# Patient Record
Sex: FEMALE | Race: WHITE
[De-identification: names, ages, dates, MRNs, and addresses within clinical notes are randomized per-mention and may not be internally consistent; named-entity substitution may affect disease eponyms.]

---

## 2017-03-21 ENCOUNTER — HOSPITAL ENCOUNTER (OUTPATIENT)
Dept: HOSPITAL 17 - PHSDC | Age: 52
Discharge: HOME | End: 2017-03-21
Attending: PAIN MEDICINE
Payer: MEDICARE

## 2017-03-21 DIAGNOSIS — M79.604: ICD-10-CM

## 2017-03-21 DIAGNOSIS — M79.605: ICD-10-CM

## 2017-03-21 DIAGNOSIS — M54.5: Primary | ICD-10-CM

## 2017-03-21 PROCEDURE — 62323 NJX INTERLAMINAR LMBR/SAC: CPT

## 2017-03-21 PROCEDURE — 99152 MOD SED SAME PHYS/QHP 5/>YRS: CPT

## 2017-03-25 NOTE — M6
cc:

JULIANNE CACERES M.D.

****

 

 

DATE

2017

 

 1965

 

PROCEDURE

Caudal epidural steroid injection.

 

PROCEDURE NOTE:  An IV was started, blood pressure cuff, pulse oximeter and EKG

were applied.  The patient was placed in the prone position on a Chemo table,

sedated with small amounts of propofol titrated to effect.  Vital signs were

monitored and remained stable throughout the procedure.  The sacral and

coccygeal area were scrubbed with antimicrobial solution, prepped with 10%

Betadine solution, draped with sterile drapes.  Fluoroscopy was used in the

lateral projection to clearly visualize the sacral hiatus.  A 2-inch 22 gauge

spinal needle was advanced under fluoroscopic guidance until it was positioned

between the anterior and posterior lamina of the sacrum.  There was negative

aspiration for blood or CSF.  No crepitus to injection of 3 mL of air.  The

patient was slowly given 20 mL of 0.5% Xylocaine, 80 mg of Depo-Medrol.

Following this the patient was taken to the recovery room with stable vital

signs, neurologically intact.

 

 

 

                              _________________________________

                              W. MD JEANETH Ray/

D:  3/21/2017/8:50 AM

T:  3/25/2017/10:11 PM

Visit #:  [id number]

Job #:  [id number]

## 2017-06-06 ENCOUNTER — HOSPITAL ENCOUNTER (OUTPATIENT)
Dept: HOSPITAL 17 - PHSDC | Age: 52
Discharge: HOME | End: 2017-06-06
Attending: PAIN MEDICINE
Payer: MEDICARE

## 2017-06-06 DIAGNOSIS — Z91.040: ICD-10-CM

## 2017-06-06 DIAGNOSIS — M79.662: ICD-10-CM

## 2017-06-06 DIAGNOSIS — M54.9: Primary | ICD-10-CM

## 2017-06-06 DIAGNOSIS — M79.661: ICD-10-CM

## 2017-06-06 DIAGNOSIS — Z88.5: ICD-10-CM

## 2017-06-06 PROCEDURE — 99152 MOD SED SAME PHYS/QHP 5/>YRS: CPT

## 2017-06-06 PROCEDURE — 62323 NJX INTERLAMINAR LMBR/SAC: CPT

## 2017-06-07 NOTE — M6
cc:

JULIANNE LOVE M.D.

****

 

 

DATE:  6/6/2017

 

YOB: 1965

 

PROCEDURE

Caudal epidural steroid injection.

 

PROCEDURE NOTE

History and physical was completed and signed.  Consent was signed.  Procedure

site was marked.  Medications were listed and reconciled.  Pain score was

recorded.  Allergies were noted.  Timeout was taken.  Fluoroscopy time was

recorded where applicable.

 

Sedation was administered or directed by Dr. Love.  The patient was given

oxygen.  The patient was monitored by a registered nurse.  Total procedure time

was greater than 15 minutes.

 

An IV was started, blood pressure cuff, pulse oximeter and EKG were applied.

The patient was placed in the prone position, sedated with small amounts of

Versed and propofol titrated to effect.  Vital signs were monitored and

remained stable throughout the procedure.  The sacral and coccygeal area were

scrubbed with antimicrobial solution and prepped with 10% Betadine solution.

The sacral hiatus was palpated.  A 2-1/2 inch, 20-gauge spinal needle was

inserted easily on the first attempt.  There was negative aspiration for blood

or CSF.  There was no apparent paresthesia.  The patient was slowly given 20 mL

of 0.5% Xylocaine, 80 mg of Depo-Medrol.  The patient was taken to the recovery

area with stable vital signs, neurologically intact.

 

 

 

                              _________________________________

                              W. MD JEANETH Ray/CAMILLE

D:  6/6/2017/9:19 AM

T:  6/7/2017/8:55 AM

Visit #:  [id number]

Job #:  [id number]

## 2017-09-18 ENCOUNTER — HOSPITAL ENCOUNTER (EMERGENCY)
Dept: HOSPITAL 17 - NEPD | Age: 52
Discharge: HOME | End: 2017-09-18
Payer: MEDICARE

## 2017-09-18 VITALS — BODY MASS INDEX: 23.88 KG/M2 | WEIGHT: 143.3 LBS | HEIGHT: 65 IN

## 2017-09-18 VITALS — HEART RATE: 86 BPM | DIASTOLIC BLOOD PRESSURE: 60 MMHG | SYSTOLIC BLOOD PRESSURE: 136 MMHG

## 2017-09-18 VITALS
TEMPERATURE: 97.6 F | HEART RATE: 77 BPM | SYSTOLIC BLOOD PRESSURE: 194 MMHG | OXYGEN SATURATION: 99 % | RESPIRATION RATE: 24 BRPM | DIASTOLIC BLOOD PRESSURE: 124 MMHG

## 2017-09-18 VITALS — DIASTOLIC BLOOD PRESSURE: 60 MMHG | SYSTOLIC BLOOD PRESSURE: 136 MMHG
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
## 2017-09-18 DIAGNOSIS — S46.911A: Primary | ICD-10-CM

## 2017-09-18 PROCEDURE — 73030 X-RAY EXAM OF SHOULDER: CPT

## 2017-09-18 PROCEDURE — 99284 EMERGENCY DEPT VISIT MOD MDM: CPT

## 2017-09-18 PROCEDURE — 96372 THER/PROPH/DIAG INJ SC/IM: CPT

## 2017-09-18 NOTE — RADRPT
EXAM DATE/TIME:  09/18/2017 10:51 

 

[facility] COMPARISON:     

No previous studies available for comparison.

 

                     

INDICATIONS :     

Right shoulder pain after falling off the roof last night.

                     

 

MEDICAL HISTORY :     

Hypertension.       Smoker.   

 

SURGICAL HISTORY :     

None.   

 

ENCOUNTER:     

Initial                                        

 

ACUITY:     

2 days      

 

PAIN SCORE:     

10/10

 

LOCATION:     

Right  shoulder joint.

 

FINDINGS:     

Degenerative changes anatomic alignment without fracture.  Previous cervical spine fusion.  Lung apex
 is clear.

 

CONCLUSION:     Negative for fracture, degenerative changes.  

 

 

 Jere Cota MD FACR on September 18, 2017 at 11:00           

Board Certified Radiologist.

 This report was verified electronically.

## 2017-09-18 NOTE — PD
HPI


Chief Complaint:  Injury


Time Seen by Provider:  10:16


Travel History


International Travel<30 days:  No


Contact w/Intl Traveler<30days:  No


Traveled to known affect area:  No





History of Present Illness


HPI


51-year-old female came to the emergency room with right shoulder pain.  

Patient says that yesterday she was up on her roof cleaning when she fell.  As 

was a fall from about 10-12 feet height.  She landed on her right shoulder she 

says.  It has been hurting a lot.  Patient says she was unable to sleep at 

night because of the pain.  Vital signs are stable.  Patient is on significant 

amount of pain medication on a daily basis at home.  She was hypertensive but 

rest of the vital signs were stable.





[facility]


Past Medical History


*** Narrative Medical


List of her past medical, surgical, social and family history as reviewed from 

the nursing note.


Hypertension:  Yes (related to pain)


Musculoskeletal:  Yes (chronic neck and back pain from hx of fx)


Tetanus Vaccination:  < 5 Years


Influenza Vaccination:  No


Pregnant?:  Not Pregnant





Past Surgical History


Hysterectomy:  Yes


Other Surgery:  Yes (neck and cervical spine)





Social History


Alcohol Use:  Yes (occasionally)


Tobacco Use:  Yes (1/2 pack a day)


Substance Use:  Yes (medical marijuana)





Allergies-Medications


(Allergen,Severity, Reaction):  


Coded Allergies:  


     morphine (Unverified  Allergy, Mild, Rash, 8/15/17)


     latex (Unverified  Allergy, Unknown, 8/15/17)


Comments


List of her allergies reviewed from the nursing note.


Reported Meds & Prescriptions





Reported Meds & Active Scripts


Active


Reported


Methadone (Methadone HCl) 10 Mg Tab 10 Mg PO TID


Vitamin C ER (Ascorbic Acid) 500 Mg Donna 500 Mg PO DAILY


Milk Thistle 140 Mg Cap 1 Cap PO DAILY


Oxycodone ER (Oxycodone HCl) 20 Mg Tab 20 Mg PO Q8HR


Restasis Opth 0.05% (Cyclosporine Opth 0.05%) 0.05% Emul 1 Drop EACH EYE BID


Vitamin D-1000 Maximum St (Cholecalciferol) 1,000 Unit Tab 1,000 Units PO DAILY


Flexeril (Cyclobenzaprine HCl) 10 Mg Tab 10 Mg PO BID


Estradiol 1 Mg Tab 1 Mg PO DAILY


Dilaudid (Hydromorphone HCl) 4 Mg Tab 4 Mg PO TID PRN


Lisinopril 20 Mg Tab 20 Mg PO BID


Lyrica (Pregabalin) 75 Mg Cap 75 Mg PO TID


Omeprazole 20 Mg Cap 1 Cap PO DAILY


Vitamin B-Complex (B-Complex Vitamins) 1 Tab 1 Tab PO DAILY





Narrative Medication


List of her home medications reviewed from the nursing note.





Review of Systems


Except as stated in HPI:  all other systems reviewed are Neg





Physical Exam


Narrative


GENERAL: Awake, alert, significant distress


SKIN: Focused skin assessment warm/dry.


HEAD: Atraumatic. Normocephalic. 


EYES: Pupils equal and round. No scleral icterus. No injection or drainage. 


ENT: No nasal bleeding or discharge.  Mucous membranes pink and moist.


NECK: Trachea midline. No JVD. 


CARDIOVASCULAR: Regular rate and rhythm.  No murmur appreciated.


RESPIRATORY: No accessory muscle use. Clear to auscultation. Breath sounds 

equal bilaterally. 


GASTROINTESTINAL: Abdomen soft, non-tender, nondistended. Hepatic and splenic 

margins not palpable. 


MUSCULOSKELETAL: No obvious deformities. No clubbing.  No cyanosis.  No edema.  

Decreased range of motion of the right shoulder joint due to pain.  No obvious 

deformity.  Tender to even touch.


NEUROLOGICAL: Awake and alert. No obvious cranial nerve deficits.  Motor 

grossly within normal limits. Normal speech.


PSYCHIATRIC: Appropriate mood and affect; insight and judgment normal.





Data


Data


Last Documented VS





Vital Signs








  Date Time  Temp Pulse Resp B/P (MAP) Pulse Ox O2 Delivery O2 Flow Rate FiO2


 


9/18/17 12:17  86  136/60 (85) 97   


 


9/18/17 09:32 97.6  24   Room Air  








Orders





 Orders


Hydromorphone Pf Inj (Dilaudid Pf Inj) (9/18/17 10:30)


Shoulder, Complete (>2vws) (9/18/17 )


Sling Cradle Arm (9/18/17 )


Sling Cradle Arm (9/18/17 )








MDM


Medical Decision Making


Medical Screen Exam Complete:  Yes


Emergency Medical Condition:  Yes


Medical Record Reviewed:  Yes


Differential Diagnosis


Shoulder dislocation, shoulder fracture


Narrative Course


11:40 AM x-ray does not show any abnormality.  I'll give her a arm sling and she

'll be discharged home.  She was given IM Dilaudid for getting the x-ray done 

given her significant discomfort.





Procedures


EKG Prior to Arrival:  No





Diagnosis





 Primary Impression:  


 Right shoulder strain


 Qualified Codes:  S46.911A - Strain of unspecified muscle, fascia and tendon 

at shoulder and upper arm level, right arm, initial encounter


Referrals:  


Primary Care Physician





***Additional Instructions:  


Please return to the ER if the condition worsen or any other new concerns.  

Continue taking your opiate medications that he already have at home for the 

pain relief.  Apply warm compresses alternating with cold compress.


***Med/Other Pt SpecificInfo:  No Change to Meds


Disposition:  01 DISCHARGE HOME


Condition:  Stable











Melba Jara MD Sep 18, 2017 10:16

## 2017-11-20 ENCOUNTER — HOSPITAL ENCOUNTER (OUTPATIENT)
Dept: HOSPITAL 17 - PHSDC | Age: 52
Discharge: HOME | End: 2017-11-20
Attending: PAIN MEDICINE
Payer: MEDICARE

## 2017-11-20 DIAGNOSIS — M79.604: ICD-10-CM

## 2017-11-20 DIAGNOSIS — M54.5: Primary | ICD-10-CM

## 2017-11-20 DIAGNOSIS — M79.605: ICD-10-CM

## 2017-11-20 PROCEDURE — 99152 MOD SED SAME PHYS/QHP 5/>YRS: CPT

## 2017-11-20 PROCEDURE — 62323 NJX INTERLAMINAR LMBR/SAC: CPT

## 2017-11-20 NOTE — M6
cc:

JULIANNE LOVE M.D.

****

 

 

DATE

11/20/2017

 

DATE OF BIRTH

1965

 

PROCEDURE

Caudal epidural steroid injection.

 

History and physical was completed and signed. Consent was signed. Procedure

site was marked. Medications were listed and reconciled. Pain score was

recorded. Allergies were noted. Time out was taken. Fluoroscopy time was

recorded where applicable.

Sedation was administered or directed by Dr. Love. The patient was given

oxygen. The patient was monitored by a registered nurse. Total procedure time

was greater than 15 minutes.

 

PROCEDURE NOTE:

An IV was started.  Blood pressure cuff, pulse oximeter and EKG were applied.

The patient was placed in the prone position, sedated with small amounts of

Versed and propofol titrated to effect.  Vital signs were monitored and

remained stable throughout the procedure.  The sacral and coccygeal area were

scrubbed with antimicrobial solution and prepped with 10% Betadine solution.

The sacral hiatus was palpated.  A 2-inch 20 gauge spinal needle was inserted

easily on the first attempt.  There was negative aspiration for blood or CSF.

There was no apparent paresthesia.  The patient was slowly given 20 mL of 0.5%

Xylocaine, 80 mg of Depo-Medrol.  The patient was taken to the recovery area

with stable vital signs, neurologically intact.

 

 

                              _________________________________

                              W. MD JEANETH Ray/MELISSA

D:  11/20/2017/10:36 AM

T:  11/20/2017/12:13 PM

Visit #:  [id number]

Job #:  [id number]

## 2018-02-09 ENCOUNTER — HOSPITAL ENCOUNTER (EMERGENCY)
Dept: HOSPITAL 17 - NEPD | Age: 53
Discharge: HOME | End: 2018-02-09
Payer: MEDICARE

## 2018-02-09 VITALS — WEIGHT: 154.32 LBS | BODY MASS INDEX: 25.71 KG/M2 | HEIGHT: 65 IN

## 2018-02-09 VITALS
OXYGEN SATURATION: 96 % | HEART RATE: 68 BPM | SYSTOLIC BLOOD PRESSURE: 184 MMHG | RESPIRATION RATE: 17 BRPM | DIASTOLIC BLOOD PRESSURE: 84 MMHG

## 2018-02-09 VITALS
TEMPERATURE: 98.3 F | SYSTOLIC BLOOD PRESSURE: 200 MMHG | RESPIRATION RATE: 22 BRPM | HEART RATE: 86 BPM | OXYGEN SATURATION: 99 % | DIASTOLIC BLOOD PRESSURE: 116 MMHG

## 2018-02-09 VITALS — DIASTOLIC BLOOD PRESSURE: 98 MMHG | SYSTOLIC BLOOD PRESSURE: 207 MMHG

## 2018-02-09 DIAGNOSIS — Z79.899: ICD-10-CM

## 2018-02-09 DIAGNOSIS — Z88.5: ICD-10-CM

## 2018-02-09 DIAGNOSIS — F17.200: ICD-10-CM

## 2018-02-09 DIAGNOSIS — I10: ICD-10-CM

## 2018-02-09 DIAGNOSIS — M25.511: Primary | ICD-10-CM

## 2018-02-09 PROCEDURE — 99283 EMERGENCY DEPT VISIT LOW MDM: CPT

## 2018-02-09 PROCEDURE — 73030 X-RAY EXAM OF SHOULDER: CPT

## 2018-02-09 NOTE — PD
HPI


Chief Complaint:  Injury


Time Seen by Provider:  14:15


Travel History


International Travel<30 days:  No


Contact w/Intl Traveler<30days:  No


Traveled to known affect area:  No





History of Present Illness


HPI


52-year-old female presents for evaluation of right arm and shoulder pain.  

Symptoms started yesterday.  She reports that yesterday she was driving, 

reached her arm behind her in order to grab her backpack when she felt sudden 

onset pain to the right shoulder and proximal arm.  The pain is an aching pain 

which is constant and worse with range of motion activities at the right 

shoulder.  She reports that she has had similar pain several times over the 

past few years and her right shoulder.  She says that she actually had an 

outpatient MRI of the right shoulder performed 2 weeks ago and she is awaiting 

follow-up in order to discuss the results.  She has no other complaints at this 

time.





[facility]


Past Medical History


Hypertension:  Yes (related to pain)


Musculoskeletal:  Yes (chronic neck and back pain from hx of fx)





Past Surgical History


Hysterectomy:  Yes


Other Surgery:  Yes (neck and cervical spine)





Social History


Alcohol Use:  Yes (occasionally)


Tobacco Use:  Yes (1/2 pack a day)


Substance Use:  Yes (medical marijuana)





Allergies-Medications


(Allergen,Severity, Reaction):  


Coded Allergies:  


     morphine (Unverified  Allergy, Mild, Rash, 2/9/18)


     latex (Unverified  Allergy, Unknown, 2/9/18)


Reported Meds & Prescriptions





Reported Meds & Active Scripts


Active


Reported


Nyquil Severe Cold/Flu Liq (Phenylephrine-Doxylamine-DM-Apap Liq) 5-6.25- 

Mg/15 Ml Liq 15 Ml PO HS


Mucinex DM Maximum Strength (Dextromethorphan-Guaifenesin ER 12 HR) 60-1,200 Mg 

Tab 1 Tab PO BID PRN


Methadone (Methadone HCl) 10 Mg Tab 10 Mg PO TID


Vitamin C ER (Ascorbic Acid) 500 Mg Donna 500 Mg PO DAILY


Milk Thistle 140 Mg Cap 1 Cap PO DAILY


Oxycodone ER (Oxycodone HCl) 20 Mg Tab 20 Mg PO Q8HR


Restasis Opth (Cyclosporine Opth) 0.05% Emul 1 Drop EACH EYE BID


Vitamin D-1000 Maximum St (Cholecalciferol) 1,000 Unit Tab 1,000 Units PO DAILY


Flexeril (Cyclobenzaprine HCl) 10 Mg Tab 10 Mg PO BID


Estradiol 1 Mg Tab 1 Mg PO DAILY


Dilaudid (Hydromorphone HCl) 4 Mg Tab 4 Mg PO TID PRN


Lisinopril 20 Mg Tab 20 Mg PO BID


Lyrica (Pregabalin) 75 Mg Cap 75 Mg PO TID


Omeprazole 20 Mg Cap 1 Cap PO DAILY


Vitamin B-Complex (B-Complex Vitamins) 1 Tab 1 Tab PO DAILY








Review of Systems


Musculoskeletal:  Positive: Limited ROM, Pain


Skin:  Positive Other (denies open wounds)


Neurologic:  No: Weakness





Physical Exam


Narrative


GENERAL: Well-developed well-nourished female in no acute distress


SKIN: Warm and dry.


HEAD: Atraumatic. Normocephalic. 


EYES: Pupils equal and round. No scleral icterus. No injection or drainage. 


ENT: No nasal bleeding or discharge.  Mucous membranes pink and moist.


NECK: Trachea midline. No JVD. 


CARDIOVASCULAR: Regular rate and rhythm.  No murmur appreciated.


RESPIRATORY: No accessory muscle use. Clear to auscultation. Breath sounds 

equal bilaterally. 


MUSCULOSKELETAL: The patient has unilateral range of motion limitation in the 

right shoulder with abduction, adduction, internal and external rotation.  

There is no obvious deformity.  There is some tenderness to palpation to the 

right lateral glenohumeral joint as well as to the proximal right biceps.  

There is no obvious biceps tendon deformity.  2+ radial pulse bilaterally.  

Distal sensation is preserved in the median, radial and ulnar nerve 

distributions.


NEUROLOGICAL: Awake and alert. No obvious cranial nerve deficits.  Motor 

grossly within normal limits. Normal speech.





Data


Data


Last Documented VS





Vital Signs








  Date Time  Temp Pulse Resp B/P (MAP) Pulse Ox O2 Delivery O2 Flow Rate FiO2


 


2/9/18 14:28     96 Room Air  


 


2/9/18 14:27  68 17 184/84 (117)    


 


2/9/18 12:34 98.3       








Orders





 Orders


Shoulder, Limited(2vws) (2/9/18 )








MDM


Medical Decision Making


Medical Screen Exam Complete:  Yes


Emergency Medical Condition:  Yes


Medical Record Reviewed:  Yes


Differential Diagnosis


Rotator cuff tear, impingement, strain, dislocation, radiculopathy, brachial 

plexopathy, biceps tendon rupture


Narrative Course


Drainage of the right shoulder was obtained revealing no acute pathology.  The 

patient is currently prescribed numerous opiate analgesics and would likely not 

benefit from any additional prescription analgesics.  She is encouraged to 

follow-up with her physician in regards to her recent outpatient MRI imaging to 

discuss the results.





Diagnosis





 Primary Impression:  


 Right shoulder pain





***Additional Instructions:  


Follow-up with your physician in regards to the outpatient MRI imaging.  Return 

for any emergent medical conditions.


***Med/Other Pt SpecificInfo:  No Change to Meds


Disposition:  01 DISCHARGE HOME


Condition:  Stable











Madhu Tse Feb 9, 2018 14:27

## 2018-02-09 NOTE — RADRPT
EXAM DATE/TIME:  02/09/2018 14:39 

 

[facility] COMPARISON:     

No previous studies available for comparison.

 

                     

INDICATIONS :     

Anterior right shoulder pain after picking up a back pack.

                     

 

MEDICAL HISTORY :            

Hypertension. Smoker. Previous right shoulder injury.   

 

SURGICAL HISTORY :        

Cervical spine.

 

ENCOUNTER:     

Initial                                        

 

ACUITY:     

1 day      

 

PAIN SCORE:     

8/10

 

LOCATION:     

Right  shoulder

 

FINDINGS:     

Two view examination of the right shoulder demonstrates no evidence of fracture or dislocation.  The 
glenohumeral and acromioclavicular joints are maintained.  Bony mineralization is normal.  Previous a
nterior and posterior spine fixation.

 

CONCLUSION:     

Negative for fracture or dislocation. Follow up in 7-10 days is suggested if symptoms persist..

 

 

 

 Jere Cota MD FACR on February 09, 2018 at 14:58           

Board Certified Radiologist.

 This report was verified electronically.